# Patient Record
Sex: FEMALE | Race: WHITE | URBAN - METROPOLITAN AREA
[De-identification: names, ages, dates, MRNs, and addresses within clinical notes are randomized per-mention and may not be internally consistent; named-entity substitution may affect disease eponyms.]

---

## 2023-05-01 ENCOUNTER — OFFICE VISIT (OUTPATIENT)
Dept: URBAN - METROPOLITAN AREA CLINIC 68 | Facility: CLINIC | Age: 73
End: 2023-05-01

## 2023-05-01 RX ORDER — ORAL SEMAGLUTIDE 7 MG/1
1 TABLET AT LEAST 30 MINUTES BEFORE FIRST FOOD, BEVERAGE OR OTHER ORAL MEDICINE OF THE DAY TABLET ORAL ONCE A DAY
Status: ACTIVE | COMMUNITY

## 2023-05-01 RX ORDER — DENOSUMAB 60 MG/ML
AS DIRECTED INJECTION SUBCUTANEOUS
Status: ACTIVE | COMMUNITY

## 2023-05-01 RX ORDER — ROSUVASTATIN CALCIUM 40 MG/1
1 TABLET TABLET, FILM COATED ORAL ONCE A DAY
Status: ACTIVE | COMMUNITY

## 2023-05-01 RX ORDER — ASCORBIC ACID 1000 MG
1 TABLET TABLET ORAL ONCE A DAY
Status: ACTIVE | COMMUNITY

## 2023-05-01 RX ORDER — GABAPENTIN 300 MG/1
1 CAPSULE CAPSULE ORAL ONCE A DAY
Status: ACTIVE | COMMUNITY

## 2023-05-01 RX ORDER — PANTOPRAZOLE SODIUM 40 MG/1
1 TABLET TABLET, DELAYED RELEASE ORAL ONCE A DAY
Qty: 90 TABLET | Refills: 1 | OUTPATIENT
Start: 2023-05-01

## 2023-05-01 RX ORDER — AZITHROMYCIN MONOHYDRATE 250 MG/1
AS DIRECTED TABLET, FILM COATED ORAL
Status: ACTIVE | COMMUNITY

## 2023-05-01 RX ORDER — LOSARTAN POTASSIUM 50 MG/1
1 TABLET TABLET ORAL ONCE A DAY
Status: ACTIVE | COMMUNITY

## 2023-05-01 RX ORDER — RIVAROXABAN 20 MG/1
1 TABLET WITH FOOD TABLET, FILM COATED ORAL ONCE A DAY
Status: ACTIVE | COMMUNITY

## 2023-05-01 RX ORDER — FLECAINIDE ACETATE 50 MG/1
AS DIRECTED TABLET ORAL
Status: ACTIVE | COMMUNITY

## 2023-05-01 RX ORDER — LEVALBUTEROL TARTRATE 45 UG/1
1 PUFF AS NEEDED AEROSOL, METERED ORAL
Status: ACTIVE | COMMUNITY

## 2023-05-01 RX ORDER — HYDROCHLOROTHIAZIDE 12.5 MG/1
1 CAPSULE IN THE MORNING CAPSULE, GELATIN COATED ORAL ONCE A DAY
Status: ACTIVE | COMMUNITY

## 2023-05-01 NOTE — HPI-MIGRATED HPI
General : Patient evaluated due to heartburn and liver cyst.  REferred having persistent heartburn, currently on OTC daily nexium.  Denies nausea, vomits, dysphagia, odynophagia,  abdominal pain, diarrhea, constipation GI bleeding or weight loss

## 2023-05-02 ENCOUNTER — OFFICE VISIT (OUTPATIENT)
Dept: URBAN - METROPOLITAN AREA CLINIC 68 | Facility: CLINIC | Age: 73
End: 2023-05-02

## 2023-06-12 ENCOUNTER — LAB OUTSIDE AN ENCOUNTER (OUTPATIENT)
Dept: URBAN - METROPOLITAN AREA CLINIC 68 | Facility: CLINIC | Age: 73
End: 2023-06-12

## 2023-06-12 ENCOUNTER — WEB ENCOUNTER (OUTPATIENT)
Dept: URBAN - METROPOLITAN AREA CLINIC 68 | Facility: CLINIC | Age: 73
End: 2023-06-12

## 2023-06-12 ENCOUNTER — OFFICE VISIT (OUTPATIENT)
Dept: URBAN - METROPOLITAN AREA CLINIC 68 | Facility: CLINIC | Age: 73
End: 2023-06-12
Payer: MEDICARE

## 2023-06-12 VITALS
SYSTOLIC BLOOD PRESSURE: 130 MMHG | WEIGHT: 157 LBS | HEIGHT: 59 IN | BODY MASS INDEX: 31.65 KG/M2 | DIASTOLIC BLOOD PRESSURE: 80 MMHG

## 2023-06-12 DIAGNOSIS — K76.0 FATTY LIVER: ICD-10-CM

## 2023-06-12 DIAGNOSIS — Z12.11 SCREENING FOR COLON CANCER: ICD-10-CM

## 2023-06-12 DIAGNOSIS — K76.89 LIVER CYST: ICD-10-CM

## 2023-06-12 DIAGNOSIS — R12 HEARTBURN: ICD-10-CM

## 2023-06-12 PROCEDURE — 99214 OFFICE O/P EST MOD 30 MIN: CPT | Performed by: INTERNAL MEDICINE

## 2023-06-12 RX ORDER — LEVALBUTEROL TARTRATE 45 UG/1
1 PUFF AS NEEDED AEROSOL, METERED ORAL
Status: ACTIVE | COMMUNITY

## 2023-06-12 RX ORDER — ROSUVASTATIN CALCIUM 40 MG/1
1 TABLET TABLET, FILM COATED ORAL ONCE A DAY
Status: ACTIVE | COMMUNITY

## 2023-06-12 RX ORDER — HYDROCHLOROTHIAZIDE 12.5 MG/1
1 CAPSULE IN THE MORNING CAPSULE, GELATIN COATED ORAL ONCE A DAY
Status: ACTIVE | COMMUNITY

## 2023-06-12 RX ORDER — LOSARTAN POTASSIUM 50 MG/1
1 TABLET TABLET ORAL ONCE A DAY
Status: ACTIVE | COMMUNITY

## 2023-06-12 RX ORDER — DENOSUMAB 60 MG/ML
AS DIRECTED INJECTION SUBCUTANEOUS
Status: ACTIVE | COMMUNITY

## 2023-06-12 RX ORDER — ASCORBIC ACID 1000 MG
1 TABLET TABLET ORAL ONCE A DAY
Status: ACTIVE | COMMUNITY

## 2023-06-12 RX ORDER — FLECAINIDE ACETATE 50 MG/1
AS DIRECTED TABLET ORAL
Status: ACTIVE | COMMUNITY

## 2023-06-12 RX ORDER — AZITHROMYCIN MONOHYDRATE 250 MG/1
AS DIRECTED TABLET, FILM COATED ORAL
Status: ACTIVE | COMMUNITY

## 2023-06-12 RX ORDER — ORAL SEMAGLUTIDE 7 MG/1
1 TABLET AT LEAST 30 MINUTES BEFORE FIRST FOOD, BEVERAGE OR OTHER ORAL MEDICINE OF THE DAY TABLET ORAL ONCE A DAY
Status: ACTIVE | COMMUNITY

## 2023-06-12 RX ORDER — GABAPENTIN 300 MG/1
1 CAPSULE CAPSULE ORAL ONCE A DAY
Status: ACTIVE | COMMUNITY

## 2023-06-12 RX ORDER — RIVAROXABAN 20 MG/1
1 TABLET WITH FOOD TABLET, FILM COATED ORAL ONCE A DAY
Status: ACTIVE | COMMUNITY

## 2023-06-12 RX ORDER — PANTOPRAZOLE SODIUM 40 MG/1
1 TABLET TABLET, DELAYED RELEASE ORAL ONCE A DAY
Qty: 90 TABLET | Refills: 1 | Status: ON HOLD | COMMUNITY
Start: 2023-05-01

## 2023-06-12 NOTE — HPI-TODAY'S VISIT:
Patient evaluated after having recent US reported with 2.5cm liver cyst (previous ct reported with hemangioma) and fatty liver.  Referred having worsening heartburn, currently on OTC nexium.  Denies nausea, vomits, dysphagia, odynophagia, heartburn, abdominal pain, diarrhea, constipation GI bleeding or weight loss

## 2023-07-07 ENCOUNTER — OFFICE VISIT (OUTPATIENT)
Dept: URBAN - METROPOLITAN AREA CLINIC 67 | Facility: CLINIC | Age: 73
End: 2023-07-07

## 2023-07-18 ENCOUNTER — OFFICE VISIT (OUTPATIENT)
Dept: URBAN - METROPOLITAN AREA CLINIC 67 | Facility: CLINIC | Age: 73
End: 2023-07-18

## 2023-07-18 ENCOUNTER — TELEPHONE ENCOUNTER (OUTPATIENT)
Dept: URBAN - METROPOLITAN AREA CLINIC 68 | Facility: CLINIC | Age: 73
End: 2023-07-18

## 2023-07-20 ENCOUNTER — OFFICE VISIT (OUTPATIENT)
Dept: URBAN - METROPOLITAN AREA SURGERY CENTER 12 | Facility: SURGERY CENTER | Age: 73
End: 2023-07-20
Payer: MEDICARE

## 2023-07-20 ENCOUNTER — CLAIMS CREATED FROM THE CLAIM WINDOW (OUTPATIENT)
Dept: URBAN - METROPOLITAN AREA CLINIC 4 | Facility: CLINIC | Age: 73
End: 2023-07-20
Payer: MEDICARE

## 2023-07-20 DIAGNOSIS — K31.7 POLYP OF STOMACH AND DUODENUM: ICD-10-CM

## 2023-07-20 DIAGNOSIS — T47.8X5A ADVERSE EFFECT OF OTHER AGENTS PRIMARILY AFFECTING GASTROINTESTINAL SYSTEM, INITIAL ENCOUNTER: ICD-10-CM

## 2023-07-20 DIAGNOSIS — K22.89 OTHER SPECIFIED DISEASE OF ESOPHAGUS: ICD-10-CM

## 2023-07-20 DIAGNOSIS — K21.9 GERD: ICD-10-CM

## 2023-07-20 DIAGNOSIS — R12 HEARTBURN: ICD-10-CM

## 2023-07-20 DIAGNOSIS — K29.70 GASTRITIS WITHOUT BLEEDING, UNSPECIFIED CHRONICITY, UNSPECIFIED GASTRITIS TYPE: ICD-10-CM

## 2023-07-20 PROBLEM — 300855001: Status: ACTIVE | Noted: 2023-07-20

## 2023-07-20 PROCEDURE — 43239 EGD BIOPSY SINGLE/MULTIPLE: CPT | Performed by: INTERNAL MEDICINE

## 2023-07-20 PROCEDURE — 88312 SPECIAL STAINS GROUP 1: CPT | Performed by: PATHOLOGY

## 2023-07-20 PROCEDURE — 88305 TISSUE EXAM BY PATHOLOGIST: CPT | Performed by: PATHOLOGY

## 2023-07-20 PROCEDURE — 00731 ANES UPR GI NDSC PX NOS: CPT | Performed by: NURSE ANESTHETIST, CERTIFIED REGISTERED

## 2023-07-20 RX ORDER — HYDROCHLOROTHIAZIDE 12.5 MG/1
1 CAPSULE IN THE MORNING CAPSULE, GELATIN COATED ORAL ONCE A DAY
Status: ACTIVE | COMMUNITY

## 2023-07-20 RX ORDER — ROSUVASTATIN CALCIUM 40 MG/1
1 TABLET TABLET, FILM COATED ORAL ONCE A DAY
Status: ACTIVE | COMMUNITY

## 2023-07-20 RX ORDER — GABAPENTIN 300 MG/1
1 CAPSULE CAPSULE ORAL ONCE A DAY
Status: ACTIVE | COMMUNITY

## 2023-07-20 RX ORDER — ASCORBIC ACID 1000 MG
1 TABLET TABLET ORAL ONCE A DAY
Status: ACTIVE | COMMUNITY

## 2023-07-20 RX ORDER — LOSARTAN POTASSIUM 50 MG/1
1 TABLET TABLET ORAL ONCE A DAY
Status: ACTIVE | COMMUNITY

## 2023-07-20 RX ORDER — ORAL SEMAGLUTIDE 7 MG/1
1 TABLET AT LEAST 30 MINUTES BEFORE FIRST FOOD, BEVERAGE OR OTHER ORAL MEDICINE OF THE DAY TABLET ORAL ONCE A DAY
Status: ACTIVE | COMMUNITY

## 2023-07-20 RX ORDER — RIVAROXABAN 20 MG/1
1 TABLET WITH FOOD TABLET, FILM COATED ORAL ONCE A DAY
Status: ACTIVE | COMMUNITY

## 2023-07-20 RX ORDER — AZITHROMYCIN MONOHYDRATE 250 MG/1
AS DIRECTED TABLET, FILM COATED ORAL
Status: ACTIVE | COMMUNITY

## 2023-07-20 RX ORDER — FLECAINIDE ACETATE 50 MG/1
AS DIRECTED TABLET ORAL
Status: ACTIVE | COMMUNITY

## 2023-07-20 RX ORDER — DENOSUMAB 60 MG/ML
AS DIRECTED INJECTION SUBCUTANEOUS
Status: ACTIVE | COMMUNITY

## 2023-07-20 RX ORDER — PANTOPRAZOLE SODIUM 40 MG/1
1 TABLET TABLET, DELAYED RELEASE ORAL ONCE A DAY
Qty: 90 TABLET | Refills: 1 | Status: ON HOLD | COMMUNITY
Start: 2023-05-01

## 2023-07-20 RX ORDER — LEVALBUTEROL TARTRATE 45 UG/1
1 PUFF AS NEEDED AEROSOL, METERED ORAL
Status: ACTIVE | COMMUNITY

## 2023-07-21 ENCOUNTER — CLAIMS CREATED FROM THE CLAIM WINDOW (OUTPATIENT)
Dept: URBAN - METROPOLITAN AREA CLINIC 67 | Facility: CLINIC | Age: 73
End: 2023-07-21
Payer: MEDICARE

## 2023-07-21 DIAGNOSIS — K76.0 FATTY (CHANGE OF) LIVER, NOT ELSEWHERE CLASSIFIED: ICD-10-CM

## 2023-07-21 PROCEDURE — 76981 USE PARENCHYMA: CPT | Performed by: INTERNAL MEDICINE

## 2023-07-21 PROCEDURE — 91200 LIVER ELASTOGRAPHY: CPT | Performed by: INTERNAL MEDICINE

## 2023-08-04 ENCOUNTER — OFFICE VISIT (OUTPATIENT)
Dept: URBAN - METROPOLITAN AREA CLINIC 68 | Facility: CLINIC | Age: 73
End: 2023-08-04
Payer: MEDICARE

## 2023-08-04 ENCOUNTER — LAB OUTSIDE AN ENCOUNTER (OUTPATIENT)
Dept: URBAN - METROPOLITAN AREA CLINIC 68 | Facility: CLINIC | Age: 73
End: 2023-08-04

## 2023-08-04 VITALS
WEIGHT: 158 LBS | DIASTOLIC BLOOD PRESSURE: 72 MMHG | HEIGHT: 59 IN | SYSTOLIC BLOOD PRESSURE: 132 MMHG | BODY MASS INDEX: 31.85 KG/M2

## 2023-08-04 DIAGNOSIS — K76.0 FATTY (CHANGE OF) LIVER, NOT ELSEWHERE CLASSIFIED: ICD-10-CM

## 2023-08-04 DIAGNOSIS — R10.13 DYSPEPSIA: ICD-10-CM

## 2023-08-04 DIAGNOSIS — K29.30 CHRONIC SUPERFICIAL GASTRITIS WITHOUT BLEEDING: ICD-10-CM

## 2023-08-04 DIAGNOSIS — K76.89 LIVER CYST: ICD-10-CM

## 2023-08-04 DIAGNOSIS — R12 HEARTBURN: ICD-10-CM

## 2023-08-04 PROBLEM — 162031009: Status: ACTIVE | Noted: 2023-08-04

## 2023-08-04 PROCEDURE — 99214 OFFICE O/P EST MOD 30 MIN: CPT | Performed by: INTERNAL MEDICINE

## 2023-08-04 RX ORDER — FLECAINIDE ACETATE 50 MG/1
AS DIRECTED TABLET ORAL
Status: ACTIVE | COMMUNITY

## 2023-08-04 RX ORDER — ASCORBIC ACID 1000 MG
1 TABLET TABLET ORAL ONCE A DAY
Status: ACTIVE | COMMUNITY

## 2023-08-04 RX ORDER — GABAPENTIN 300 MG/1
1 CAPSULE CAPSULE ORAL ONCE A DAY
Status: ON HOLD | COMMUNITY

## 2023-08-04 RX ORDER — ORAL SEMAGLUTIDE 7 MG/1
1 TABLET AT LEAST 30 MINUTES BEFORE FIRST FOOD, BEVERAGE OR OTHER ORAL MEDICINE OF THE DAY TABLET ORAL ONCE A DAY
Status: ACTIVE | COMMUNITY

## 2023-08-04 RX ORDER — HYDROCHLOROTHIAZIDE 12.5 MG/1
1 CAPSULE IN THE MORNING CAPSULE, GELATIN COATED ORAL ONCE A DAY
Status: ACTIVE | COMMUNITY

## 2023-08-04 RX ORDER — PANTOPRAZOLE SODIUM 40 MG/1
1 TABLET TABLET, DELAYED RELEASE ORAL ONCE A DAY
Qty: 90 TABLET | Refills: 1 | Status: ON HOLD | COMMUNITY
Start: 2023-05-01

## 2023-08-04 RX ORDER — LEVALBUTEROL TARTRATE 45 UG/1
1 PUFF AS NEEDED AEROSOL, METERED ORAL
Status: ACTIVE | COMMUNITY

## 2023-08-04 RX ORDER — AZITHROMYCIN MONOHYDRATE 250 MG/1
AS DIRECTED TABLET, FILM COATED ORAL
Status: ON HOLD | COMMUNITY

## 2023-08-04 RX ORDER — LOSARTAN POTASSIUM 50 MG/1
1 TABLET TABLET ORAL ONCE A DAY
Status: ACTIVE | COMMUNITY

## 2023-08-04 RX ORDER — DENOSUMAB 60 MG/ML
AS DIRECTED INJECTION SUBCUTANEOUS
Status: ACTIVE | COMMUNITY

## 2023-08-04 RX ORDER — RIVAROXABAN 20 MG/1
1 TABLET WITH FOOD TABLET, FILM COATED ORAL ONCE A DAY
Status: ACTIVE | COMMUNITY

## 2023-08-04 RX ORDER — ROSUVASTATIN CALCIUM 40 MG/1
1 TABLET TABLET, FILM COATED ORAL ONCE A DAY
Status: ACTIVE | COMMUNITY

## 2023-08-04 NOTE — HPI-TODAY'S VISIT:
Patient evaluated after having EGD found with mild gastritis. Also had Fibroscan that was negative for major steatosis/fibrosis.  Today referred that is feeling that continues with intermittent heartburn.  Referred having post pandrial dyspepsia mostly with fatty food.  Denies nausea, vomits, dysphagia, odynophagia,, abdominal pain, diarrhea, constipation GI bleeding or weight loss

## 2023-08-04 NOTE — PHYSICAL EXAM CARDIOVASCULAR:
no edema,  no murmurs,  regular rate and rhythm
Dr. Lara: I have personally seen and examined this patient at the bedside. I have fully participated in the care of this patient. I have personally prepared all pertinent clinical information, including history, physical exam, plan and agree w the Resident's note and agree except as noted.

## 2023-11-16 ENCOUNTER — ERX REFILL RESPONSE (OUTPATIENT)
Dept: URBAN - METROPOLITAN AREA CLINIC 68 | Facility: CLINIC | Age: 73
End: 2023-11-16

## 2023-11-16 RX ORDER — PANTOPRAZOLE SODIUM 40 MG/1
1 TABLET TABLET, DELAYED RELEASE ORAL ONCE A DAY
Qty: 90 TABLET | Refills: 1 | OUTPATIENT

## 2023-11-16 RX ORDER — PANTOPRAZOLE SODIUM 40 MG/1
TAKE 1 TABLET BY MOUTH EVERY DAY FOR 90 DAYS TABLET, DELAYED RELEASE ORAL
Qty: 90 TABLET | Refills: 1 | OUTPATIENT

## 2023-12-11 ENCOUNTER — DASHBOARD ENCOUNTERS (OUTPATIENT)
Age: 73
End: 2023-12-11

## 2023-12-11 ENCOUNTER — OFFICE VISIT (OUTPATIENT)
Dept: URBAN - METROPOLITAN AREA CLINIC 68 | Facility: CLINIC | Age: 73
End: 2023-12-11
Payer: MEDICARE

## 2023-12-11 VITALS
DIASTOLIC BLOOD PRESSURE: 70 MMHG | HEIGHT: 59 IN | HEART RATE: 65 BPM | OXYGEN SATURATION: 97 % | SYSTOLIC BLOOD PRESSURE: 130 MMHG

## 2023-12-11 DIAGNOSIS — K76.89 LIVER CYST: ICD-10-CM

## 2023-12-11 DIAGNOSIS — K29.30 CHRONIC SUPERFICIAL GASTRITIS WITHOUT BLEEDING: ICD-10-CM

## 2023-12-11 DIAGNOSIS — K76.0 FATTY LIVER: ICD-10-CM

## 2023-12-11 DIAGNOSIS — R12 HEARTBURN: ICD-10-CM

## 2023-12-11 PROCEDURE — 99214 OFFICE O/P EST MOD 30 MIN: CPT

## 2023-12-11 RX ORDER — AZITHROMYCIN MONOHYDRATE 250 MG/1
AS DIRECTED TABLET, FILM COATED ORAL
Status: ACTIVE | COMMUNITY

## 2023-12-11 RX ORDER — LOSARTAN POTASSIUM 50 MG/1
1 TABLET TABLET ORAL ONCE A DAY
Status: ACTIVE | COMMUNITY

## 2023-12-11 RX ORDER — ROSUVASTATIN CALCIUM 40 MG/1
1 TABLET TABLET, FILM COATED ORAL ONCE A DAY
Status: ACTIVE | COMMUNITY

## 2023-12-11 RX ORDER — LEVALBUTEROL TARTRATE 45 UG/1
1 PUFF AS NEEDED AEROSOL, METERED ORAL
Status: ACTIVE | COMMUNITY

## 2023-12-11 RX ORDER — PANTOPRAZOLE SODIUM 40 MG/1
TAKE 1 TABLET BY MOUTH EVERY DAY FOR 90 DAYS TABLET, DELAYED RELEASE ORAL
Qty: 90 TABLET | Refills: 1 | Status: ACTIVE | COMMUNITY

## 2023-12-11 RX ORDER — UMECLIDINIUM 62.5 UG/1
1 PUFF AEROSOL, POWDER ORAL ONCE A DAY
Status: ACTIVE | COMMUNITY

## 2023-12-11 RX ORDER — AZITHROMYCIN MONOHYDRATE 250 MG/1
AS DIRECTED TABLET, FILM COATED ORAL
Status: ON HOLD | COMMUNITY

## 2023-12-11 RX ORDER — ASCORBIC ACID 1000 MG
1 TABLET TABLET ORAL ONCE A DAY
Status: ACTIVE | COMMUNITY

## 2023-12-11 RX ORDER — RIVAROXABAN 20 MG/1
1 TABLET WITH FOOD TABLET, FILM COATED ORAL ONCE A DAY
Status: ACTIVE | COMMUNITY

## 2023-12-11 RX ORDER — GABAPENTIN 300 MG/1
1 CAPSULE CAPSULE ORAL ONCE A DAY
Status: ON HOLD | COMMUNITY

## 2023-12-11 RX ORDER — FLECAINIDE ACETATE 50 MG/1
AS DIRECTED TABLET ORAL
Status: ACTIVE | COMMUNITY

## 2023-12-11 RX ORDER — DENOSUMAB 60 MG/ML
AS DIRECTED INJECTION SUBCUTANEOUS
Status: ACTIVE | COMMUNITY

## 2023-12-11 RX ORDER — HYDROCHLOROTHIAZIDE 12.5 MG/1
1 CAPSULE IN THE MORNING CAPSULE, GELATIN COATED ORAL ONCE A DAY
Status: ACTIVE | COMMUNITY

## 2023-12-11 RX ORDER — CARVEDILOL 6.25 MG/1
1 TABLET WITH FOOD TABLET, FILM COATED ORAL TWICE A DAY
Status: ACTIVE | COMMUNITY

## 2023-12-11 RX ORDER — ORAL SEMAGLUTIDE 7 MG/1
1 TABLET AT LEAST 30 MINUTES BEFORE FIRST FOOD, BEVERAGE OR OTHER ORAL MEDICINE OF THE DAY TABLET ORAL ONCE A DAY
Status: ACTIVE | COMMUNITY

## 2023-12-11 NOTE — HPI-TODAY'S VISIT:
Patient presents for HIDA scan results. Results reviewed with patient today. Refers she continues with intermittent acid reflux at night despite Nexium 40mg/d and Pepcid 20mg qhs. Denies nausea, vomits, dysphagia, odynophagia,, abdominal pain, diarrhea, constipation GI bleeding or weight loss.

## 2025-02-12 ENCOUNTER — LAB OUTSIDE AN ENCOUNTER (OUTPATIENT)
Dept: URBAN - METROPOLITAN AREA CLINIC 68 | Facility: CLINIC | Age: 75
End: 2025-02-12

## 2025-02-12 ENCOUNTER — OFFICE VISIT (OUTPATIENT)
Dept: URBAN - METROPOLITAN AREA CLINIC 68 | Facility: CLINIC | Age: 75
End: 2025-02-12
Payer: MEDICARE

## 2025-02-12 VITALS
WEIGHT: 158 LBS | DIASTOLIC BLOOD PRESSURE: 70 MMHG | BODY MASS INDEX: 31.85 KG/M2 | HEIGHT: 59 IN | SYSTOLIC BLOOD PRESSURE: 128 MMHG

## 2025-02-12 DIAGNOSIS — K29.30 CHRONIC SUPERFICIAL GASTRITIS WITHOUT BLEEDING: ICD-10-CM

## 2025-02-12 DIAGNOSIS — Z12.11 SCREENING FOR COLON CANCER: ICD-10-CM

## 2025-02-12 DIAGNOSIS — R12 HEARTBURN: ICD-10-CM

## 2025-02-12 DIAGNOSIS — K76.0 FATTY LIVER: ICD-10-CM

## 2025-02-12 DIAGNOSIS — R10.13 DYSPEPSIA: ICD-10-CM

## 2025-02-12 PROCEDURE — 99214 OFFICE O/P EST MOD 30 MIN: CPT | Performed by: INTERNAL MEDICINE

## 2025-02-12 RX ORDER — UMECLIDINIUM 62.5 UG/1
1 PUFF AEROSOL, POWDER ORAL ONCE A DAY
Status: ACTIVE | COMMUNITY

## 2025-02-12 RX ORDER — LEVALBUTEROL TARTRATE 45 UG/1
1 PUFF AS NEEDED AEROSOL, METERED ORAL
Status: ON HOLD | COMMUNITY

## 2025-02-12 RX ORDER — HYDROCHLOROTHIAZIDE 12.5 MG/1
1 CAPSULE IN THE MORNING CAPSULE, GELATIN COATED ORAL ONCE A DAY
Status: ACTIVE | COMMUNITY

## 2025-02-12 RX ORDER — ASCORBIC ACID 1000 MG
1 TABLET TABLET ORAL ONCE A DAY
Status: ACTIVE | COMMUNITY

## 2025-02-12 RX ORDER — ORAL SEMAGLUTIDE 7 MG/1
1 TABLET AT LEAST 30 MINUTES BEFORE FIRST FOOD, BEVERAGE OR OTHER ORAL MEDICINE OF THE DAY TABLET ORAL ONCE A DAY
Status: ON HOLD | COMMUNITY

## 2025-02-12 RX ORDER — ROSUVASTATIN 40 MG/1
1 TABLET TABLET, FILM COATED ORAL ONCE A DAY
Status: ACTIVE | COMMUNITY

## 2025-02-12 RX ORDER — PANTOPRAZOLE SODIUM 40 MG/1
TAKE 1 TABLET BY MOUTH EVERY DAY FOR 90 DAYS TABLET, DELAYED RELEASE ORAL
Qty: 90 TABLET | Refills: 1 | Status: ON HOLD | COMMUNITY

## 2025-02-12 RX ORDER — LOSARTAN POTASSIUM 50 MG/1
1 TABLET TABLET ORAL ONCE A DAY
Status: ACTIVE | COMMUNITY

## 2025-02-12 RX ORDER — GABAPENTIN 300 MG/1
1 CAPSULE CAPSULE ORAL ONCE A DAY
Status: ON HOLD | COMMUNITY

## 2025-02-12 RX ORDER — CARVEDILOL 6.25 MG/1
1 TABLET WITH FOOD TABLET, FILM COATED ORAL TWICE A DAY
Status: ACTIVE | COMMUNITY

## 2025-02-12 RX ORDER — RIVAROXABAN 20 MG/1
1 TABLET WITH FOOD TABLET, FILM COATED ORAL ONCE A DAY
Status: ACTIVE | COMMUNITY

## 2025-02-12 RX ORDER — LEVALBUTEROL TARTRATE 45 UG/1
1 PUFF AS NEEDED AEROSOL, METERED ORAL
Status: ACTIVE | COMMUNITY

## 2025-02-12 RX ORDER — DENOSUMAB 60 MG/ML
AS DIRECTED INJECTION SUBCUTANEOUS
Status: ON HOLD | COMMUNITY

## 2025-02-12 RX ORDER — AZITHROMYCIN MONOHYDRATE 250 MG/1
AS DIRECTED TABLET, FILM COATED ORAL
Status: ON HOLD | COMMUNITY

## 2025-02-12 RX ORDER — FLECAINIDE ACETATE 50 MG/1
AS DIRECTED TABLET ORAL
Status: ACTIVE | COMMUNITY

## 2025-02-12 NOTE — HPI-TODAY'S VISIT:
Patient evaluated due to fatty liver, heartburn and chronic constipation. Referred having intermittent episodes of constipation, currently on miralax PRN. Referred having worsning dyspepsia sypmtoms, currently on nexium. Refered having recurrent episodes of heartburn as well.  Denies nausea, vomits, dysphagia, odynophagia,abdominal pain, diarrhea, GI bleeding or weight loss

## 2025-03-04 ENCOUNTER — TELEPHONE ENCOUNTER (OUTPATIENT)
Dept: URBAN - METROPOLITAN AREA CLINIC 67 | Facility: CLINIC | Age: 75
End: 2025-03-04

## 2025-03-05 ENCOUNTER — OFFICE VISIT (OUTPATIENT)
Dept: URBAN - METROPOLITAN AREA SURGERY CENTER 12 | Facility: SURGERY CENTER | Age: 75
End: 2025-03-05

## 2025-03-05 RX ORDER — DENOSUMAB 60 MG/ML
AS DIRECTED INJECTION SUBCUTANEOUS
Status: ON HOLD | COMMUNITY

## 2025-03-05 RX ORDER — LEVALBUTEROL TARTRATE 45 UG/1
1 PUFF AS NEEDED AEROSOL, METERED ORAL
Status: ACTIVE | COMMUNITY

## 2025-03-05 RX ORDER — AZITHROMYCIN MONOHYDRATE 250 MG/1
AS DIRECTED TABLET, FILM COATED ORAL
Status: ON HOLD | COMMUNITY

## 2025-03-05 RX ORDER — GABAPENTIN 300 MG/1
1 CAPSULE CAPSULE ORAL ONCE A DAY
Status: ON HOLD | COMMUNITY

## 2025-03-05 RX ORDER — ASCORBIC ACID 1000 MG
1 TABLET TABLET ORAL ONCE A DAY
Status: ACTIVE | COMMUNITY

## 2025-03-05 RX ORDER — UMECLIDINIUM 62.5 UG/1
1 PUFF AEROSOL, POWDER ORAL ONCE A DAY
Status: ACTIVE | COMMUNITY

## 2025-03-05 RX ORDER — RIVAROXABAN 20 MG/1
1 TABLET WITH FOOD TABLET, FILM COATED ORAL ONCE A DAY
Status: ACTIVE | COMMUNITY

## 2025-03-05 RX ORDER — ORAL SEMAGLUTIDE 7 MG/1
1 TABLET AT LEAST 30 MINUTES BEFORE FIRST FOOD, BEVERAGE OR OTHER ORAL MEDICINE OF THE DAY TABLET ORAL ONCE A DAY
Status: ON HOLD | COMMUNITY

## 2025-03-05 RX ORDER — LOSARTAN POTASSIUM 50 MG/1
1 TABLET TABLET ORAL ONCE A DAY
Status: ACTIVE | COMMUNITY

## 2025-03-05 RX ORDER — HYDROCHLOROTHIAZIDE 12.5 MG/1
1 CAPSULE IN THE MORNING CAPSULE, GELATIN COATED ORAL ONCE A DAY
Status: ACTIVE | COMMUNITY

## 2025-03-05 RX ORDER — FLECAINIDE ACETATE 50 MG/1
AS DIRECTED TABLET ORAL
Status: ACTIVE | COMMUNITY

## 2025-03-05 RX ORDER — PANTOPRAZOLE SODIUM 40 MG/1
TAKE 1 TABLET BY MOUTH EVERY DAY FOR 90 DAYS TABLET, DELAYED RELEASE ORAL
Qty: 90 TABLET | Refills: 1 | Status: ON HOLD | COMMUNITY

## 2025-03-05 RX ORDER — LEVALBUTEROL TARTRATE 45 UG/1
1 PUFF AS NEEDED AEROSOL, METERED ORAL
Status: ON HOLD | COMMUNITY

## 2025-03-05 RX ORDER — ROSUVASTATIN 40 MG/1
1 TABLET TABLET, FILM COATED ORAL ONCE A DAY
Status: ACTIVE | COMMUNITY

## 2025-03-05 RX ORDER — CARVEDILOL 6.25 MG/1
1 TABLET WITH FOOD TABLET, FILM COATED ORAL TWICE A DAY
Status: ACTIVE | COMMUNITY

## 2025-03-06 ENCOUNTER — OFFICE VISIT (OUTPATIENT)
Dept: URBAN - METROPOLITAN AREA SURGERY CENTER 12 | Facility: SURGERY CENTER | Age: 75
End: 2025-03-06
Payer: MEDICARE

## 2025-03-06 ENCOUNTER — CLAIMS CREATED FROM THE CLAIM WINDOW (OUTPATIENT)
Dept: URBAN - METROPOLITAN AREA CLINIC 4 | Facility: CLINIC | Age: 75
End: 2025-03-06
Payer: MEDICARE

## 2025-03-06 DIAGNOSIS — K31.89 REACTIVE GASTROPATHY: ICD-10-CM

## 2025-03-06 DIAGNOSIS — K31.7 POLYP OF STOMACH AND DUODENUM: ICD-10-CM

## 2025-03-06 DIAGNOSIS — K29.70 GASTRITIS: ICD-10-CM

## 2025-03-06 DIAGNOSIS — K22.89 OTHER SPECIFIED DISEASE OF ESOPHAGUS: ICD-10-CM

## 2025-03-06 DIAGNOSIS — K21.9 ESOPHAGEAL REFLUX: ICD-10-CM

## 2025-03-06 DIAGNOSIS — K31.89 OTHER DISEASES OF STOMACH AND DUODENUM: ICD-10-CM

## 2025-03-06 DIAGNOSIS — K29.70 GASTRITIS, UNSPECIFIED, WITHOUT BLEEDING: ICD-10-CM

## 2025-03-06 DIAGNOSIS — K29.70 GASTRITIS WITHOUT BLEEDING, UNSPECIFIED CHRONICITY, UNSPECIFIED GASTRITIS TYPE: ICD-10-CM

## 2025-03-06 DIAGNOSIS — T47.8X5A ADVERSE EFFECT OF OTHER AGENTS PRIMARILY AFFECTING GASTROINTESTINAL SYSTEM, INITIAL ENCOUNTER: ICD-10-CM

## 2025-03-06 DIAGNOSIS — K31.7 POLYP OF STOMACH FUNDIC GLAND: ICD-10-CM

## 2025-03-06 DIAGNOSIS — K21.9 GASTRO-ESOPHAGEAL REFLUX DISEASE WITHOUT ESOPHAGITIS: ICD-10-CM

## 2025-03-06 PROCEDURE — 43239 EGD BIOPSY SINGLE/MULTIPLE: CPT | Performed by: INTERNAL MEDICINE

## 2025-03-06 PROCEDURE — 88305 TISSUE EXAM BY PATHOLOGIST: CPT | Performed by: PATHOLOGY

## 2025-03-06 PROCEDURE — 88312 SPECIAL STAINS GROUP 1: CPT | Performed by: PATHOLOGY

## 2025-03-06 PROCEDURE — 00731 ANES UPR GI NDSC PX NOS: CPT | Performed by: NURSE ANESTHETIST, CERTIFIED REGISTERED

## 2025-03-06 PROCEDURE — 88313 SPECIAL STAINS GROUP 2: CPT | Performed by: PATHOLOGY

## 2025-03-06 RX ORDER — LOSARTAN POTASSIUM 50 MG/1
1 TABLET TABLET ORAL ONCE A DAY
Status: ACTIVE | COMMUNITY

## 2025-03-06 RX ORDER — ORAL SEMAGLUTIDE 7 MG/1
1 TABLET AT LEAST 30 MINUTES BEFORE FIRST FOOD, BEVERAGE OR OTHER ORAL MEDICINE OF THE DAY TABLET ORAL ONCE A DAY
Status: ON HOLD | COMMUNITY

## 2025-03-06 RX ORDER — RIVAROXABAN 20 MG/1
1 TABLET WITH FOOD TABLET, FILM COATED ORAL ONCE A DAY
Status: ACTIVE | COMMUNITY

## 2025-03-06 RX ORDER — AZITHROMYCIN MONOHYDRATE 250 MG/1
AS DIRECTED TABLET, FILM COATED ORAL
Status: ON HOLD | COMMUNITY

## 2025-03-06 RX ORDER — PANTOPRAZOLE SODIUM 40 MG/1
TAKE 1 TABLET BY MOUTH EVERY DAY FOR 90 DAYS TABLET, DELAYED RELEASE ORAL
Qty: 90 TABLET | Refills: 1 | Status: ON HOLD | COMMUNITY

## 2025-03-06 RX ORDER — CARVEDILOL 6.25 MG/1
1 TABLET WITH FOOD TABLET, FILM COATED ORAL TWICE A DAY
Status: ACTIVE | COMMUNITY

## 2025-03-06 RX ORDER — LEVALBUTEROL TARTRATE 45 UG/1
1 PUFF AS NEEDED AEROSOL, METERED ORAL
Status: ACTIVE | COMMUNITY

## 2025-03-06 RX ORDER — HYDROCHLOROTHIAZIDE 12.5 MG/1
1 CAPSULE IN THE MORNING CAPSULE, GELATIN COATED ORAL ONCE A DAY
Status: ACTIVE | COMMUNITY

## 2025-03-06 RX ORDER — ASCORBIC ACID 1000 MG
1 TABLET TABLET ORAL ONCE A DAY
Status: ACTIVE | COMMUNITY

## 2025-03-06 RX ORDER — UMECLIDINIUM 62.5 UG/1
1 PUFF AEROSOL, POWDER ORAL ONCE A DAY
Status: ACTIVE | COMMUNITY

## 2025-03-06 RX ORDER — LEVALBUTEROL TARTRATE 45 UG/1
1 PUFF AS NEEDED AEROSOL, METERED ORAL
Status: ON HOLD | COMMUNITY

## 2025-03-06 RX ORDER — GABAPENTIN 300 MG/1
1 CAPSULE CAPSULE ORAL ONCE A DAY
Status: ON HOLD | COMMUNITY

## 2025-03-06 RX ORDER — FLECAINIDE ACETATE 50 MG/1
AS DIRECTED TABLET ORAL
Status: ACTIVE | COMMUNITY

## 2025-03-06 RX ORDER — ROSUVASTATIN 40 MG/1
1 TABLET TABLET, FILM COATED ORAL ONCE A DAY
Status: ACTIVE | COMMUNITY

## 2025-03-06 RX ORDER — DENOSUMAB 60 MG/ML
AS DIRECTED INJECTION SUBCUTANEOUS
Status: ON HOLD | COMMUNITY

## 2025-03-20 ENCOUNTER — OFFICE VISIT (OUTPATIENT)
Dept: URBAN - METROPOLITAN AREA CLINIC 68 | Facility: CLINIC | Age: 75
End: 2025-03-20

## 2025-03-24 ENCOUNTER — LAB OUTSIDE AN ENCOUNTER (OUTPATIENT)
Dept: URBAN - METROPOLITAN AREA CLINIC 68 | Facility: CLINIC | Age: 75
End: 2025-03-24

## 2025-03-24 ENCOUNTER — TELEPHONE ENCOUNTER (OUTPATIENT)
Dept: URBAN - METROPOLITAN AREA CLINIC 68 | Facility: CLINIC | Age: 75
End: 2025-03-24

## 2025-03-24 ENCOUNTER — OFFICE VISIT (OUTPATIENT)
Dept: URBAN - METROPOLITAN AREA CLINIC 68 | Facility: CLINIC | Age: 75
End: 2025-03-24
Payer: MEDICARE

## 2025-03-24 DIAGNOSIS — K29.30 CHRONIC SUPERFICIAL GASTRITIS WITHOUT BLEEDING: ICD-10-CM

## 2025-03-24 DIAGNOSIS — R12 HEARTBURN: ICD-10-CM

## 2025-03-24 DIAGNOSIS — K59.09 CHRONIC CONSTIPATION: ICD-10-CM

## 2025-03-24 DIAGNOSIS — K76.0 FATTY LIVER: ICD-10-CM

## 2025-03-24 DIAGNOSIS — Z12.11 SCREENING FOR COLON CANCER: ICD-10-CM

## 2025-03-24 PROBLEM — 236069009: Status: ACTIVE | Noted: 2025-03-24

## 2025-03-24 PROCEDURE — 99214 OFFICE O/P EST MOD 30 MIN: CPT

## 2025-03-24 RX ORDER — RIVAROXABAN 20 MG/1
1 TABLET WITH FOOD TABLET, FILM COATED ORAL ONCE A DAY
Status: ACTIVE | COMMUNITY

## 2025-03-24 RX ORDER — UMECLIDINIUM 62.5 UG/1
1 PUFF AEROSOL, POWDER ORAL ONCE A DAY
Status: ON HOLD | COMMUNITY

## 2025-03-24 RX ORDER — HYDROCHLOROTHIAZIDE 12.5 MG/1
1 CAPSULE IN THE MORNING CAPSULE, GELATIN COATED ORAL ONCE A DAY
Status: ACTIVE | COMMUNITY

## 2025-03-24 RX ORDER — PANTOPRAZOLE SODIUM 40 MG/1
TAKE 1 TABLET BY MOUTH EVERY DAY FOR 90 DAYS TABLET, DELAYED RELEASE ORAL
Qty: 90 TABLET | Refills: 1 | Status: ON HOLD | COMMUNITY

## 2025-03-24 RX ORDER — BUDESONIDE, GLYCOPYRROLATE, AND FORMOTEROL FUMARATE 160; 9; 4.8 UG/1; UG/1; UG/1
2 PUFFS AEROSOL, METERED RESPIRATORY (INHALATION) TWICE A DAY
Status: ACTIVE | COMMUNITY

## 2025-03-24 RX ORDER — ROSUVASTATIN 40 MG/1
1 TABLET TABLET, FILM COATED ORAL ONCE A DAY
Status: ACTIVE | COMMUNITY

## 2025-03-24 RX ORDER — LEVALBUTEROL TARTRATE 45 UG/1
1 PUFF AS NEEDED AEROSOL, METERED ORAL
Status: ACTIVE | COMMUNITY

## 2025-03-24 RX ORDER — LOSARTAN POTASSIUM 50 MG/1
1 TABLET TABLET ORAL ONCE A DAY
Status: ACTIVE | COMMUNITY

## 2025-03-24 RX ORDER — ASCORBIC ACID 1000 MG
1 TABLET TABLET ORAL ONCE A DAY
Status: ON HOLD | COMMUNITY

## 2025-03-24 RX ORDER — CARVEDILOL 6.25 MG/1
1 TABLET WITH FOOD TABLET, FILM COATED ORAL TWICE A DAY
Status: ACTIVE | COMMUNITY

## 2025-03-24 RX ORDER — FLECAINIDE ACETATE 50 MG/1
AS DIRECTED TABLET ORAL
Status: ACTIVE | COMMUNITY

## 2025-03-24 RX ORDER — LEVALBUTEROL TARTRATE 45 UG/1
1 PUFF AS NEEDED AEROSOL, METERED ORAL
Status: ON HOLD | COMMUNITY

## 2025-03-24 RX ORDER — GABAPENTIN 300 MG/1
1 CAPSULE CAPSULE ORAL ONCE A DAY
Status: ACTIVE | COMMUNITY

## 2025-03-24 RX ORDER — NITROGLYCERIN 0.4 MG/1
1 TABLET UNDER THE TONGUE AND ALLOW TO DISSOLVE AS NEEDED. TAKE EVERY 5 MINUTES UP TO 3 TIMES IF CHEST PAIN PERSISTS TABLET SUBLINGUAL THREE TIMES A DAY
Status: ACTIVE | COMMUNITY

## 2025-03-24 RX ORDER — AZITHROMYCIN MONOHYDRATE 250 MG/1
AS DIRECTED TABLET, FILM COATED ORAL
Status: ON HOLD | COMMUNITY

## 2025-03-24 RX ORDER — ORAL SEMAGLUTIDE 7 MG/1
1 TABLET AT LEAST 30 MINUTES BEFORE FIRST FOOD, BEVERAGE OR OTHER ORAL MEDICINE OF THE DAY TABLET ORAL ONCE A DAY
Status: ON HOLD | COMMUNITY

## 2025-03-24 RX ORDER — DENOSUMAB 60 MG/ML
AS DIRECTED INJECTION SUBCUTANEOUS
Status: ON HOLD | COMMUNITY

## 2025-03-24 NOTE — HPI-TODAY'S VISIT:
Patient presents for follow-up sp EGD. Refers continues with episodes of heartburn/reflux despite Nexium. Refers having intermittent episodes of constipation described as small stools and straining, however will have episodes of FI with daily Miralax.  Denies nausea/vomiting, dysphagia, odynophagia, abdominal pain, melena, rectal bleeding, diarrhea, weight loss, fever.

## 2025-03-25 ENCOUNTER — OFFICE VISIT (OUTPATIENT)
Dept: URBAN - METROPOLITAN AREA CLINIC 67 | Facility: CLINIC | Age: 75
End: 2025-03-25
Payer: MEDICARE

## 2025-03-25 ENCOUNTER — CLAIMS CREATED FROM THE CLAIM WINDOW (OUTPATIENT)
Dept: URBAN - METROPOLITAN AREA CLINIC 68 | Facility: CLINIC | Age: 75
End: 2025-03-25
Payer: MEDICARE

## 2025-03-25 DIAGNOSIS — K76.0 FATTY LIVER: ICD-10-CM

## 2025-03-25 PROCEDURE — 76981 USE PARENCHYMA: CPT | Performed by: INTERNAL MEDICINE

## 2025-03-25 RX ORDER — ORAL SEMAGLUTIDE 7 MG/1
1 TABLET AT LEAST 30 MINUTES BEFORE FIRST FOOD, BEVERAGE OR OTHER ORAL MEDICINE OF THE DAY TABLET ORAL ONCE A DAY
Status: ON HOLD | COMMUNITY

## 2025-03-25 RX ORDER — HYDROCHLOROTHIAZIDE 12.5 MG/1
1 CAPSULE IN THE MORNING CAPSULE, GELATIN COATED ORAL ONCE A DAY
Status: ACTIVE | COMMUNITY

## 2025-03-25 RX ORDER — DENOSUMAB 60 MG/ML
AS DIRECTED INJECTION SUBCUTANEOUS
Status: ON HOLD | COMMUNITY

## 2025-03-25 RX ORDER — PANTOPRAZOLE SODIUM 40 MG/1
TAKE 1 TABLET BY MOUTH EVERY DAY FOR 90 DAYS TABLET, DELAYED RELEASE ORAL
Qty: 90 TABLET | Refills: 1 | Status: ON HOLD | COMMUNITY

## 2025-03-25 RX ORDER — ASCORBIC ACID 1000 MG
1 TABLET TABLET ORAL ONCE A DAY
Status: ON HOLD | COMMUNITY

## 2025-03-25 RX ORDER — BUDESONIDE, GLYCOPYRROLATE, AND FORMOTEROL FUMARATE 160; 9; 4.8 UG/1; UG/1; UG/1
2 PUFFS AEROSOL, METERED RESPIRATORY (INHALATION) TWICE A DAY
Status: ACTIVE | COMMUNITY

## 2025-03-25 RX ORDER — AZITHROMYCIN MONOHYDRATE 250 MG/1
AS DIRECTED TABLET, FILM COATED ORAL
Status: ON HOLD | COMMUNITY

## 2025-03-25 RX ORDER — CARVEDILOL 6.25 MG/1
1 TABLET WITH FOOD TABLET, FILM COATED ORAL TWICE A DAY
Status: ACTIVE | COMMUNITY

## 2025-03-25 RX ORDER — RIVAROXABAN 20 MG/1
1 TABLET WITH FOOD TABLET, FILM COATED ORAL ONCE A DAY
Status: ACTIVE | COMMUNITY

## 2025-03-25 RX ORDER — UMECLIDINIUM 62.5 UG/1
1 PUFF AEROSOL, POWDER ORAL ONCE A DAY
Status: ON HOLD | COMMUNITY

## 2025-03-25 RX ORDER — FLECAINIDE ACETATE 50 MG/1
AS DIRECTED TABLET ORAL
Status: ACTIVE | COMMUNITY

## 2025-03-25 RX ORDER — NITROGLYCERIN 0.4 MG/1
1 TABLET UNDER THE TONGUE AND ALLOW TO DISSOLVE AS NEEDED. TAKE EVERY 5 MINUTES UP TO 3 TIMES IF CHEST PAIN PERSISTS TABLET SUBLINGUAL THREE TIMES A DAY
Status: ACTIVE | COMMUNITY

## 2025-03-25 RX ORDER — LOSARTAN POTASSIUM 50 MG/1
1 TABLET TABLET ORAL ONCE A DAY
Status: ACTIVE | COMMUNITY

## 2025-03-25 RX ORDER — LEVALBUTEROL TARTRATE 45 UG/1
1 PUFF AS NEEDED AEROSOL, METERED ORAL
Status: ACTIVE | COMMUNITY

## 2025-03-25 RX ORDER — LEVALBUTEROL TARTRATE 45 UG/1
1 PUFF AS NEEDED AEROSOL, METERED ORAL
Status: ON HOLD | COMMUNITY

## 2025-03-25 RX ORDER — GABAPENTIN 300 MG/1
1 CAPSULE CAPSULE ORAL ONCE A DAY
Status: ACTIVE | COMMUNITY

## 2025-03-25 RX ORDER — ROSUVASTATIN 40 MG/1
1 TABLET TABLET, FILM COATED ORAL ONCE A DAY
Status: ACTIVE | COMMUNITY

## 2025-04-03 ENCOUNTER — OFFICE VISIT (OUTPATIENT)
Dept: URBAN - METROPOLITAN AREA CLINIC 68 | Facility: CLINIC | Age: 75
End: 2025-04-03

## 2025-04-03 RX ORDER — RIVAROXABAN 20 MG/1
1 TABLET WITH FOOD TABLET, FILM COATED ORAL ONCE A DAY
COMMUNITY

## 2025-04-03 RX ORDER — NITROGLYCERIN 0.4 MG/1
1 TABLET UNDER THE TONGUE AND ALLOW TO DISSOLVE AS NEEDED. TAKE EVERY 5 MINUTES UP TO 3 TIMES IF CHEST PAIN PERSISTS TABLET SUBLINGUAL THREE TIMES A DAY
COMMUNITY

## 2025-04-03 RX ORDER — ROSUVASTATIN 40 MG/1
1 TABLET TABLET, FILM COATED ORAL ONCE A DAY
COMMUNITY

## 2025-04-03 RX ORDER — AZITHROMYCIN MONOHYDRATE 250 MG/1
AS DIRECTED TABLET, FILM COATED ORAL
COMMUNITY

## 2025-04-03 RX ORDER — LOSARTAN POTASSIUM 50 MG/1
1 TABLET TABLET ORAL ONCE A DAY
COMMUNITY

## 2025-04-03 RX ORDER — HYDROCHLOROTHIAZIDE 12.5 MG/1
1 CAPSULE IN THE MORNING CAPSULE, GELATIN COATED ORAL ONCE A DAY
COMMUNITY

## 2025-04-03 RX ORDER — CARVEDILOL 6.25 MG/1
1 TABLET WITH FOOD TABLET, FILM COATED ORAL TWICE A DAY
COMMUNITY

## 2025-04-03 RX ORDER — BUDESONIDE, GLYCOPYRROLATE, AND FORMOTEROL FUMARATE 160; 9; 4.8 UG/1; UG/1; UG/1
2 PUFFS AEROSOL, METERED RESPIRATORY (INHALATION) TWICE A DAY
COMMUNITY

## 2025-04-03 RX ORDER — UMECLIDINIUM 62.5 UG/1
1 PUFF AEROSOL, POWDER ORAL ONCE A DAY
COMMUNITY

## 2025-04-03 RX ORDER — GABAPENTIN 300 MG/1
1 CAPSULE CAPSULE ORAL ONCE A DAY
COMMUNITY

## 2025-04-03 RX ORDER — DENOSUMAB 60 MG/ML
AS DIRECTED INJECTION SUBCUTANEOUS
COMMUNITY

## 2025-04-03 RX ORDER — FLECAINIDE ACETATE 50 MG/1
AS DIRECTED TABLET ORAL
COMMUNITY

## 2025-04-03 RX ORDER — LEVALBUTEROL TARTRATE 45 UG/1
1 PUFF AS NEEDED AEROSOL, METERED ORAL
COMMUNITY

## 2025-04-03 RX ORDER — ORAL SEMAGLUTIDE 7 MG/1
1 TABLET AT LEAST 30 MINUTES BEFORE FIRST FOOD, BEVERAGE OR OTHER ORAL MEDICINE OF THE DAY TABLET ORAL ONCE A DAY
COMMUNITY

## 2025-04-03 RX ORDER — ASCORBIC ACID 1000 MG
1 TABLET TABLET ORAL ONCE A DAY
COMMUNITY

## 2025-04-03 RX ORDER — PANTOPRAZOLE SODIUM 40 MG/1
TAKE 1 TABLET BY MOUTH EVERY DAY FOR 90 DAYS TABLET, DELAYED RELEASE ORAL
Qty: 90 TABLET | Refills: 1 | COMMUNITY

## 2025-04-10 ENCOUNTER — LAB OUTSIDE AN ENCOUNTER (OUTPATIENT)
Dept: URBAN - METROPOLITAN AREA CLINIC 68 | Facility: CLINIC | Age: 75
End: 2025-04-10

## 2025-04-17 ENCOUNTER — OFFICE VISIT (OUTPATIENT)
Dept: URBAN - METROPOLITAN AREA CLINIC 68 | Facility: CLINIC | Age: 75
End: 2025-04-17
Payer: MEDICARE

## 2025-04-17 DIAGNOSIS — K59.09 CHRONIC CONSTIPATION: ICD-10-CM

## 2025-04-17 DIAGNOSIS — Z12.11 SCREENING FOR COLON CANCER: ICD-10-CM

## 2025-04-17 DIAGNOSIS — K29.30 CHRONIC SUPERFICIAL GASTRITIS WITHOUT BLEEDING: ICD-10-CM

## 2025-04-17 DIAGNOSIS — K82.4 GALLBLADDER POLYP: ICD-10-CM

## 2025-04-17 DIAGNOSIS — R12 HEARTBURN: ICD-10-CM

## 2025-04-17 DIAGNOSIS — K76.0 FATTY LIVER: ICD-10-CM

## 2025-04-17 PROBLEM — 197433003: Status: ACTIVE | Noted: 2025-04-17

## 2025-04-17 PROCEDURE — 99214 OFFICE O/P EST MOD 30 MIN: CPT

## 2025-04-17 RX ORDER — AZITHROMYCIN MONOHYDRATE 250 MG/1
AS DIRECTED TABLET, FILM COATED ORAL
Status: ACTIVE | COMMUNITY

## 2025-04-17 RX ORDER — LEVALBUTEROL TARTRATE 45 UG/1
1 PUFF AS NEEDED AEROSOL, METERED ORAL
Status: ACTIVE | COMMUNITY

## 2025-04-17 RX ORDER — GABAPENTIN 300 MG/1
1 CAPSULE CAPSULE ORAL ONCE A DAY
Status: ACTIVE | COMMUNITY

## 2025-04-17 RX ORDER — OMEPRAZOLE 40 MG/1
1 CAPSULE 1/2 TO 1 HOUR BEFORE MORNING MEAL CAPSULE, DELAYED RELEASE ORAL ONCE A DAY
Qty: 90 | Refills: 0 | OUTPATIENT
Start: 2025-04-17

## 2025-04-17 RX ORDER — PANTOPRAZOLE SODIUM 40 MG/1
TAKE 1 TABLET BY MOUTH EVERY DAY FOR 90 DAYS TABLET, DELAYED RELEASE ORAL
Qty: 90 TABLET | Refills: 1 | Status: ACTIVE | COMMUNITY

## 2025-04-17 RX ORDER — ROSUVASTATIN 40 MG/1
1 TABLET TABLET, FILM COATED ORAL ONCE A DAY
Status: ACTIVE | COMMUNITY

## 2025-04-17 RX ORDER — ASCORBIC ACID 1000 MG
1 TABLET TABLET ORAL ONCE A DAY
Status: ACTIVE | COMMUNITY

## 2025-04-17 RX ORDER — DENOSUMAB 60 MG/ML
AS DIRECTED INJECTION SUBCUTANEOUS
Status: ACTIVE | COMMUNITY

## 2025-04-17 RX ORDER — FLECAINIDE ACETATE 50 MG/1
AS DIRECTED TABLET ORAL
Status: ACTIVE | COMMUNITY

## 2025-04-17 RX ORDER — ORAL SEMAGLUTIDE 7 MG/1
1 TABLET AT LEAST 30 MINUTES BEFORE FIRST FOOD, BEVERAGE OR OTHER ORAL MEDICINE OF THE DAY TABLET ORAL ONCE A DAY
Status: ACTIVE | COMMUNITY

## 2025-04-17 RX ORDER — LOSARTAN POTASSIUM 50 MG/1
1 TABLET TABLET ORAL ONCE A DAY
Status: ACTIVE | COMMUNITY

## 2025-04-17 RX ORDER — FAMOTIDINE 40 MG/1
1 TABLET AT BEDTIME TABLET, FILM COATED ORAL ONCE A DAY
Qty: 90 TABLET | Refills: 1 | OUTPATIENT
Start: 2025-04-17

## 2025-04-17 RX ORDER — BUDESONIDE, GLYCOPYRROLATE, AND FORMOTEROL FUMARATE 160; 9; 4.8 UG/1; UG/1; UG/1
2 PUFFS AEROSOL, METERED RESPIRATORY (INHALATION) TWICE A DAY
Status: ACTIVE | COMMUNITY

## 2025-04-17 RX ORDER — NITROGLYCERIN 0.4 MG/1
1 TABLET UNDER THE TONGUE AND ALLOW TO DISSOLVE AS NEEDED. TAKE EVERY 5 MINUTES UP TO 3 TIMES IF CHEST PAIN PERSISTS TABLET SUBLINGUAL THREE TIMES A DAY
Status: ACTIVE | COMMUNITY

## 2025-04-17 RX ORDER — HYDROCHLOROTHIAZIDE 12.5 MG/1
1 CAPSULE IN THE MORNING CAPSULE, GELATIN COATED ORAL ONCE A DAY
Status: ACTIVE | COMMUNITY

## 2025-04-17 RX ORDER — RIVAROXABAN 20 MG/1
1 TABLET WITH FOOD TABLET, FILM COATED ORAL ONCE A DAY
Status: ACTIVE | COMMUNITY

## 2025-04-17 RX ORDER — CARVEDILOL 6.25 MG/1
1 TABLET WITH FOOD TABLET, FILM COATED ORAL TWICE A DAY
Status: ACTIVE | COMMUNITY

## 2025-04-17 RX ORDER — UMECLIDINIUM 62.5 UG/1
1 PUFF AEROSOL, POWDER ORAL ONCE A DAY
Status: ACTIVE | COMMUNITY

## 2025-04-17 NOTE — HPI-TODAY'S VISIT:
Patient with history of heartburn, fatty liver, and chronic constipation presents for follow-up sp US and fibroscan. Refers continues with episodes of heartburn/reflux despite Nexium in the morning and OTC Pepcid qhs. Refers had good relief with Voquezna however felt this caused bloating/gas. Refers continues on daily Miralax with adequate control of constipation. Refers has upcoming cardiac ablation on 5/7/25. Denies nausea/vomiting, dysphagia, odynophagia, abdominal pain, melena, rectal bleeding, diarrhea, weight loss, fever.

## 2025-05-27 ENCOUNTER — TELEPHONE ENCOUNTER (OUTPATIENT)
Dept: URBAN - METROPOLITAN AREA CLINIC 68 | Facility: CLINIC | Age: 75
End: 2025-05-27

## 2025-05-29 ENCOUNTER — OFFICE VISIT (OUTPATIENT)
Dept: URBAN - METROPOLITAN AREA CLINIC 68 | Facility: CLINIC | Age: 75
End: 2025-05-29

## 2025-07-08 ENCOUNTER — ERX REFILL RESPONSE (OUTPATIENT)
Dept: URBAN - METROPOLITAN AREA CLINIC 68 | Facility: CLINIC | Age: 75
End: 2025-07-08

## 2025-07-08 RX ORDER — OMEPRAZOLE 40 MG/1
1 CAPSULE 1/2 TO 1 HOUR BEFORE MORNING MEAL ORALLY ONCE A DAY CAPSULE, DELAYED RELEASE ORAL
Qty: 90 CAPSULE | Refills: 0